# Patient Record
Sex: MALE | Race: WHITE | NOT HISPANIC OR LATINO | Employment: PART TIME | URBAN - NONMETROPOLITAN AREA
[De-identification: names, ages, dates, MRNs, and addresses within clinical notes are randomized per-mention and may not be internally consistent; named-entity substitution may affect disease eponyms.]

---

## 2023-12-17 ENCOUNTER — HOSPITAL ENCOUNTER (EMERGENCY)
Facility: HOSPITAL | Age: 38
Discharge: HOME | End: 2023-12-17
Attending: FAMILY MEDICINE
Payer: MEDICAID

## 2023-12-17 VITALS
TEMPERATURE: 98 F | WEIGHT: 236 LBS | HEART RATE: 64 BPM | SYSTOLIC BLOOD PRESSURE: 118 MMHG | RESPIRATION RATE: 18 BRPM | OXYGEN SATURATION: 99 % | BODY MASS INDEX: 31.97 KG/M2 | DIASTOLIC BLOOD PRESSURE: 78 MMHG | HEIGHT: 72 IN

## 2023-12-17 DIAGNOSIS — N20.0 KIDNEY STONE: ICD-10-CM

## 2023-12-17 DIAGNOSIS — N13.30 HYDRONEPHROSIS, UNSPECIFIED HYDRONEPHROSIS TYPE: Primary | ICD-10-CM

## 2023-12-17 LAB
APPEARANCE UR: CLEAR
BILIRUB UR QL STRIP.AUTO: NEGATIVE
COLOR UR AUTO: YELLOW
GLUCOSE UR QL STRIP.AUTO: NEGATIVE
KETONES UR QL STRIP.AUTO: NEGATIVE
LEUKOCYTE ESTERASE UR QL STRIP.AUTO: NEGATIVE
NITRITE UR QL STRIP.AUTO: NEGATIVE
PH UR STRIP.AUTO: 6.5 [PH]
PROT UR QL STRIP.AUTO: NEGATIVE
RBC UR QL AUTO: NEGATIVE
SP GR UR STRIP.AUTO: 1.02 (ref 1–1.03)
UROBILINOGEN UR STRIP-ACNC: 0.2

## 2023-12-17 PROCEDURE — 63600175 PHARM REV CODE 636 W HCPCS: Performed by: FAMILY MEDICINE

## 2023-12-17 PROCEDURE — 96372 THER/PROPH/DIAG INJ SC/IM: CPT | Performed by: FAMILY MEDICINE

## 2023-12-17 PROCEDURE — 99285 EMERGENCY DEPT VISIT HI MDM: CPT | Mod: 25

## 2023-12-17 PROCEDURE — 81003 URINALYSIS AUTO W/O SCOPE: CPT | Performed by: FAMILY MEDICINE

## 2023-12-17 PROCEDURE — 25000003 PHARM REV CODE 250: Performed by: FAMILY MEDICINE

## 2023-12-17 RX ORDER — KETOROLAC TROMETHAMINE 30 MG/ML
60 INJECTION, SOLUTION INTRAMUSCULAR; INTRAVENOUS
Status: COMPLETED | OUTPATIENT
Start: 2023-12-17 | End: 2023-12-17

## 2023-12-17 RX ORDER — CIPROFLOXACIN 500 MG/1
500 TABLET ORAL 2 TIMES DAILY
Qty: 20 TABLET | Refills: 0 | Status: SHIPPED | OUTPATIENT
Start: 2023-12-17 | End: 2023-12-27

## 2023-12-17 RX ORDER — OXYCODONE AND ACETAMINOPHEN 5; 325 MG/1; MG/1
1 TABLET ORAL EVERY 4 HOURS PRN
Qty: 10 TABLET | Refills: 0 | Status: SHIPPED | OUTPATIENT
Start: 2023-12-17 | End: 2024-01-08 | Stop reason: SDUPTHER

## 2023-12-17 RX ORDER — ACETAMINOPHEN 500 MG
1000 TABLET ORAL
Status: COMPLETED | OUTPATIENT
Start: 2023-12-17 | End: 2023-12-17

## 2023-12-17 RX ADMIN — ACETAMINOPHEN 1000 MG: 500 TABLET, FILM COATED ORAL at 10:12

## 2023-12-17 RX ADMIN — KETOROLAC TROMETHAMINE 60 MG: 30 INJECTION, SOLUTION INTRAMUSCULAR; INTRAVENOUS at 10:12

## 2023-12-17 NOTE — ED PROVIDER NOTES
Encounter Date: 12/17/2023       History     Chief Complaint   Patient presents with    Flank Pain     Pt c/o left sided flank pain onset 9 months increasing in intensity this morning.       Patient presents for LEFT Flank pain. Pain is sharp. Constant for the past two days. Patient denies having any nausea and vomiting. Patient denies having any other associated symptoms at present.      The history is provided by the patient.     Review of patient's allergies indicates:  No Known Allergies  History reviewed. No pertinent past medical history.  History reviewed. No pertinent surgical history.  History reviewed. No pertinent family history.     Review of Systems   Constitutional: Negative.    HENT: Negative.     Eyes: Negative.    Respiratory: Negative.     Cardiovascular: Negative.    Gastrointestinal:  Positive for abdominal pain.   Endocrine: Negative.    Genitourinary:  Positive for flank pain.   Skin: Negative.    Allergic/Immunologic: Negative.    Neurological: Negative.    Hematological: Negative.    Psychiatric/Behavioral: Negative.         Physical Exam     Initial Vitals [12/17/23 0914]   BP Pulse Resp Temp SpO2   134/84 65 15 97.8 °F (36.6 °C) 100 %      MAP       --         Physical Exam    Vitals reviewed.  Constitutional: He appears well-developed.   HENT:   Head: Normocephalic and atraumatic.   Eyes: EOM are normal. Pupils are equal, round, and reactive to light.   Neck: Neck supple.   Normal range of motion.  Cardiovascular:  Normal rate, regular rhythm and normal heart sounds.           Pulmonary/Chest: Breath sounds normal.   Abdominal: Abdomen is soft. Bowel sounds are normal.   Musculoskeletal:         General: Normal range of motion.      Cervical back: Normal range of motion and neck supple.     Neurological: He is alert and oriented to person, place, and time. He has normal reflexes. GCS score is 15. GCS eye subscore is 4. GCS verbal subscore is 5. GCS motor subscore is 6.   Skin: Skin is  warm.   Psychiatric: He has a normal mood and affect.         ED Course   Procedures  Labs Reviewed   URINALYSIS - Normal    Narrative:      URINE STABILITY IS 2 HOURS AT ROOM TEMP OR    SIX HOURS REFRIGERATED. PERFORMING TESTING ON    SPECIMENS GREATER THAN THIS AGE MAY AFFECT THE    FOLLOWING TESTS:    PH          SPECIFIC GRAVITY           BLOOD    CLARITY     BILIRUBIN               UROBILINOGEN          Imaging Results              CT Abdomen Pelvis  Without Contrast (Final result)  Result time 12/17/23 10:12:20      Final result by Yovanny Francis MD (12/17/23 10:12:20)                   Impression:      Mild left hydronephrosis secondary to a stone in the left renal pelvis measuring 2.1 cm.      Electronically signed by: Yovanny Francis  Date:    12/17/2023  Time:    10:12               Narrative:    EXAMINATION:  CT ABDOMEN PELVIS WITHOUT CONTRAST    CLINICAL HISTORY:  Abdominal pain, acute, nonlocalized;    TECHNIQUE:  Low dose axial images, sagittal and coronal reformations were obtained from the lung bases to the pubic symphysis without contrast.    COMPARISON:  None    FINDINGS:  Heart: Normal in size. No pericardial effusion.    Lung Bases: Well aerated, without consolidation or pleural fluid.    Liver: Normal in size and attenuation, with no focal hepatic lesions.    Gallbladder: No calcified gallstones.    Bile Ducts: No evidence of dilated ducts.    Pancreas: No ductal dilatation or peripancreatic fat stranding.    Spleen: Unremarkable.    Adrenals: Unremarkable.    Kidneys/ Ureters: Mild left hydronephrosis secondary to a stone in the left renal pelvis measuring 2.1 cm.  No right-sided renal/ureteral stones or hydronephrosis.    Bladder: Decompressed, limiting evaluation.    Reproductive organs: Unremarkable.    GI Tract/Mesentery: No evidence of bowel obstruction or inflammation.  Normal appendix.    Peritoneal Space: No ascites. No free air.    Retroperitoneum: No significant  adenopathy.    Abdominal wall: Unremarkable.    Vasculature: No significant atherosclerosis or aneurysm.    Bones: No acute fracture.  Mild degenerative changes of the spine.                                       Medications   ketorolac injection 60 mg (60 mg Intramuscular Given 12/17/23 1012)   acetaminophen tablet 1,000 mg (1,000 mg Oral Given 12/17/23 1012)     Medical Decision Making  Amount and/or Complexity of Data Reviewed  Labs: ordered.  Radiology: ordered.    Risk  OTC drugs.  Prescription drug management.                                      Clinical Impression:  Final diagnoses:  [N13.30] Hydronephrosis, unspecified hydronephrosis type (Primary)  [N20.0] Kidney stone          ED Disposition Condition    Discharge Stable          ED Prescriptions       Medication Sig Dispense Start Date End Date Auth. Provider    oxyCODONE-acetaminophen (PERCOCET) 5-325 mg per tablet Take 1 tablet by mouth every 4 (four) hours as needed for Pain. 10 tablet 12/17/2023 -- Yovanny Orellana MD    ciprofloxacin HCl (CIPRO) 500 MG tablet Take 1 tablet (500 mg total) by mouth 2 (two) times daily. for 10 days 20 tablet 12/17/2023 12/27/2023 Yovanny Orellana MD          Follow-up Information    None          Yovanny Orellana MD  12/17/23 2352

## 2023-12-19 ENCOUNTER — LAB VISIT (OUTPATIENT)
Dept: LAB | Facility: HOSPITAL | Age: 38
End: 2023-12-19
Attending: NURSE PRACTITIONER
Payer: MEDICAID

## 2023-12-19 ENCOUNTER — TELEPHONE (OUTPATIENT)
Dept: UROLOGY | Facility: CLINIC | Age: 38
End: 2023-12-19
Payer: MEDICAID

## 2023-12-19 DIAGNOSIS — N13.30 HYDRONEPHROSIS: Primary | ICD-10-CM

## 2023-12-19 DIAGNOSIS — N13.2 HYDRONEPHROSIS WITH RENAL AND URETERAL CALCULOUS OBSTRUCTION: Primary | ICD-10-CM

## 2023-12-19 LAB
ALBUMIN SERPL-MCNC: 4.5 G/DL (ref 3.4–5)
ALBUMIN/GLOB SERPL: 1.6 RATIO
ALP SERPL-CCNC: 78 UNIT/L (ref 50–144)
ALT SERPL-CCNC: 42 UNIT/L (ref 1–45)
ANION GAP SERPL CALC-SCNC: 7 MEQ/L (ref 2–13)
AST SERPL-CCNC: 41 UNIT/L (ref 17–59)
BASOPHILS # BLD AUTO: 0.07 X10(3)/MCL (ref 0.01–0.08)
BASOPHILS NFR BLD AUTO: 1.1 % (ref 0.1–1.2)
BILIRUB SERPL-MCNC: 0.5 MG/DL (ref 0–1)
BUN SERPL-MCNC: 13 MG/DL (ref 7–20)
CALCIUM SERPL-MCNC: 9.5 MG/DL (ref 8.4–10.2)
CHLORIDE SERPL-SCNC: 105 MMOL/L (ref 98–110)
CO2 SERPL-SCNC: 28 MMOL/L (ref 21–32)
CREAT SERPL-MCNC: 0.79 MG/DL (ref 0.66–1.25)
CREAT/UREA NIT SERPL: 16 (ref 12–20)
EOSINOPHIL # BLD AUTO: 0.35 X10(3)/MCL (ref 0.04–0.54)
EOSINOPHIL NFR BLD AUTO: 5.3 % (ref 0.7–7)
ERYTHROCYTE [DISTWIDTH] IN BLOOD BY AUTOMATED COUNT: 12.3 %
GFR SERPLBLD CREATININE-BSD FMLA CKD-EPI: >90 MLS/MIN/1.73/M2
GLOBULIN SER-MCNC: 2.9 GM/DL (ref 2–3.9)
GLUCOSE SERPL-MCNC: 84 MG/DL (ref 70–115)
HCT VFR BLD AUTO: 48.2 % (ref 36–52)
HGB BLD-MCNC: 16.3 G/DL (ref 13–18)
IMM GRANULOCYTES # BLD AUTO: 0.03 X10(3)/MCL (ref 0–0.03)
IMM GRANULOCYTES NFR BLD AUTO: 0.5 % (ref 0–0.5)
LYMPHOCYTES # BLD AUTO: 1.4 X10(3)/MCL (ref 1.32–3.57)
LYMPHOCYTES NFR BLD AUTO: 21.2 % (ref 20–55)
MCH RBC QN AUTO: 31.9 PG (ref 27–34)
MCHC RBC AUTO-ENTMCNC: 33.8 G/DL (ref 31–37)
MCV RBC AUTO: 94.3 FL (ref 79–99)
MONOCYTES # BLD AUTO: 0.61 X10(3)/MCL (ref 0.3–0.82)
MONOCYTES NFR BLD AUTO: 9.2 % (ref 4.7–12.5)
NEUTROPHILS # BLD AUTO: 4.15 X10(3)/MCL (ref 1.78–5.38)
NEUTROPHILS NFR BLD AUTO: 62.7 % (ref 37–73)
NRBC BLD AUTO-RTO: 0 %
PLATELET # BLD AUTO: 284 X10(3)/MCL (ref 140–371)
PMV BLD AUTO: 9.8 FL (ref 9.4–12.4)
POTASSIUM SERPL-SCNC: 4.7 MMOL/L (ref 3.5–5.1)
PROT SERPL-MCNC: 7.4 GM/DL (ref 6.3–8.2)
RBC # BLD AUTO: 5.11 X10(6)/MCL (ref 4–6)
SODIUM SERPL-SCNC: 140 MMOL/L (ref 135–145)
WBC # SPEC AUTO: 6.61 X10(3)/MCL (ref 4–11.5)

## 2023-12-19 PROCEDURE — 85025 COMPLETE CBC W/AUTO DIFF WBC: CPT

## 2023-12-19 PROCEDURE — 36415 COLL VENOUS BLD VENIPUNCTURE: CPT

## 2023-12-19 PROCEDURE — 80053 COMPREHEN METABOLIC PANEL: CPT

## 2023-12-19 NOTE — TELEPHONE ENCOUNTER
Called and let a message for pt to call back      ----- Message from Alyx Marie sent at 12/19/2023  3:40 PM CST -----  Contact: Beltran  .Type:  Patient Returning Call    Who Called:Beltran   Who Left Message for Patient:nurse   Does the patient know what this is regarding?:appt   Would the patient rather a call back or a response via [facility]? Call   Best Call Back Number:.[phone]   Additional Information: Pt requesting a call back

## 2023-12-20 ENCOUNTER — OFFICE VISIT (OUTPATIENT)
Dept: UROLOGY | Facility: CLINIC | Age: 38
End: 2023-12-20
Payer: MEDICAID

## 2023-12-20 ENCOUNTER — HOSPITAL ENCOUNTER (OUTPATIENT)
Dept: RADIOLOGY | Facility: HOSPITAL | Age: 38
Discharge: HOME | End: 2023-12-20
Attending: NURSE PRACTITIONER
Payer: MEDICAID

## 2023-12-20 VITALS — BODY MASS INDEX: 30.66 KG/M2 | WEIGHT: 226.38 LBS | HEIGHT: 72 IN

## 2023-12-20 DIAGNOSIS — N13.30 HYDRONEPHROSIS: Primary | ICD-10-CM

## 2023-12-20 DIAGNOSIS — R82.71 BACTERIA IN URINE: ICD-10-CM

## 2023-12-20 LAB
BILIRUBIN, UA POC OHS: ABNORMAL
BLOOD, UA POC OHS: ABNORMAL
CLARITY, UA POC OHS: CLEAR
COLOR, UA POC OHS: YELLOW
GLUCOSE, UA POC OHS: NEGATIVE
KETONES, UA POC OHS: ABNORMAL
LEUKOCYTES, UA POC OHS: ABNORMAL
NITRITE, UA POC OHS: NEGATIVE
PH, UA POC OHS: 5.5
PROTEIN, UA POC OHS: 30
SPECIFIC GRAVITY, UA POC OHS: >=1.03
UROBILINOGEN, UA POC OHS: 0.2

## 2023-12-20 PROCEDURE — 1159F PR MEDICATION LIST DOCUMENTED IN MEDICAL RECORD: ICD-10-PCS | Mod: CPTII,S$GLB,, | Performed by: NURSE PRACTITIONER

## 2023-12-20 PROCEDURE — 3008F BODY MASS INDEX DOCD: CPT | Mod: CPTII,S$GLB,, | Performed by: NURSE PRACTITIONER

## 2023-12-20 PROCEDURE — 1159F MED LIST DOCD IN RCRD: CPT | Mod: CPTII,S$GLB,, | Performed by: NURSE PRACTITIONER

## 2023-12-20 PROCEDURE — 3008F PR BODY MASS INDEX (BMI) DOCUMENTED: ICD-10-PCS | Mod: CPTII,S$GLB,, | Performed by: NURSE PRACTITIONER

## 2023-12-20 PROCEDURE — 81003 URINALYSIS AUTO W/O SCOPE: CPT | Mod: QW,S$GLB,, | Performed by: NURSE PRACTITIONER

## 2023-12-20 PROCEDURE — 74018 RADEX ABDOMEN 1 VIEW: CPT | Mod: TC

## 2023-12-20 PROCEDURE — 81003 POCT URINALYSIS(INSTRUMENT): ICD-10-PCS | Mod: QW,S$GLB,, | Performed by: NURSE PRACTITIONER

## 2023-12-20 PROCEDURE — 99204 PR OFFICE/OUTPT VISIT, NEW, LEVL IV, 45-59 MIN: ICD-10-PCS | Mod: S$GLB,,, | Performed by: NURSE PRACTITIONER

## 2023-12-20 PROCEDURE — 99204 OFFICE O/P NEW MOD 45 MIN: CPT | Mod: S$GLB,,, | Performed by: NURSE PRACTITIONER

## 2023-12-20 NOTE — PROGRESS NOTES
"Subjective:       Patient ID: Beltran Car is a 38 y.o. male.    Chief Complaint: No chief complaint on file.      HPI: 38-year-old male new to the service presents today with family member complaints of left flank pain.  Patient states it has been ongoing for months.  He states that while in "alf" an x-ray was performed identifying a stone.  More recently December 17th this year he presented to the emergency room with severe left flank pain.  CT scan identified at 2.1 cm left UVJ stone.  Patient was given Toradol IM and Tylenol in the hospital discharged on Percocet and an oral antibiotic of which he does not remember the name.  He has been afebrile.  His pain waxes and wanes.  Currently he is comfortable         Past Medical History:   Past Medical History:   Diagnosis Date    Kidney stone        Past Surgical Historical: History reviewed. No pertinent surgical history.     Medications:   Medication List with Changes/Refills   Current Medications    CIPROFLOXACIN HCL (CIPRO) 500 MG TABLET    Take 1 tablet (500 mg total) by mouth 2 (two) times daily. for 10 days    OXYCODONE-ACETAMINOPHEN (PERCOCET) 5-325 MG PER TABLET    Take 1 tablet by mouth every 4 (four) hours as needed for Pain.        Past Social History:   Social History     Socioeconomic History    Marital status: Single   Tobacco Use    Smoking status: Every Day     Types: Cigarettes    Smokeless tobacco: Never       Allergies: Review of patient's allergies indicates:  No Known Allergies     Family History: History reviewed. No pertinent family history.     Review of Systems:  Review of Systems   Constitutional:  Negative for fever and unexpected weight change.   HENT:  Negative for facial swelling and trouble swallowing.    Eyes:  Negative for pain and visual disturbance.   Respiratory:  Negative for chest tightness, shortness of breath and wheezing.    Cardiovascular:  Negative for leg swelling.   Gastrointestinal:  Negative for abdominal distention, " abdominal pain, anal bleeding, blood in stool and rectal pain.   Genitourinary:  Positive for flank pain. Negative for decreased urine volume, difficulty urinating, dysuria, enuresis, frequency, hematuria and urgency.   Musculoskeletal:  Negative for back pain.   Skin:  Negative for color change.   Neurological:  Negative for dizziness, seizures, syncope and weakness.   Psychiatric/Behavioral:  Negative for suicidal ideas.        Physical Exam:  Physical Exam  Constitutional:       Appearance: He is well-developed.   HENT:      Head: Normocephalic.   Eyes:      General: No scleral icterus.  Pulmonary:      Effort: Pulmonary effort is normal.      Breath sounds: Normal breath sounds.   Abdominal:      General: There is no distension.      Palpations: Abdomen is soft.      Tenderness: There is no abdominal tenderness.      Hernia: No hernia is present. There is no hernia in the right inguinal area or left inguinal area.   Genitourinary:     Penis: Normal.       Testes: Normal. Cremasteric reflex is present.   Musculoskeletal:      Cervical back: Normal range of motion.   Skin:     General: Skin is warm and dry.   Neurological:      Mental Status: He is alert and oriented to person, place, and time.         Assessment/Plan:   Left renal pelvis versus UVJ stone--symptomatic.  We are going to repeat a KUB at this time so I can further evaluate whether patient will be candidate for ESWL versus laser stone extraction.  Urinalysis today shows 10-20 white cells, 1+ bacteria nitrite negative.  Will culture urine.  He is asymptomatic I asked him to continue his current antibiotic as provided by the emergency room till we get culture results.  Problem List Items Addressed This Visit    None  Visit Diagnoses       Bacteria in urine    -  Primary    Relevant Orders    Urine culture    Hydronephrosis        Relevant Orders    X-Ray KUB    POCT Urinalysis(Instrument)

## 2023-12-22 ENCOUNTER — TELEPHONE (OUTPATIENT)
Dept: UROLOGY | Facility: CLINIC | Age: 38
End: 2023-12-22
Payer: MEDICAID

## 2023-12-22 NOTE — TELEPHONE ENCOUNTER
----- Message from Mirtha Hsu sent at 12/21/2023 10:55 AM CST -----  Regarding: office notes  Zoe caal/[facility] calling for office notes for pt visit 12/20/2023 and they can be reached at [phone] and fax number [phone].    Thanks,

## 2023-12-22 NOTE — TELEPHONE ENCOUNTER
----- Message from Scot Maddox sent at 12/22/2023  9:24 AM CST -----  Contact: Beltran Saul is calling to get a refill on oxyCODONE-acetaminophen (PERCOCET) 5-325 mg per tablet. Please call back at [phone]             [facility] [place] - [address]  [address] [place] [address]  Phone: [phone] Fax: [phone]  Hours: Not open 24 hours                Thanks  SW

## 2023-12-22 NOTE — TELEPHONE ENCOUNTER
Contacted pt, pt is requesting refill. Provider RC/NP has not prescribe pt meds. Suggested to rotate Tylenol/Ibuprofen. If needing refill he needs to contact provider that prescribed rx. Pt verbalized understanding. BJP    ----- Message from Mirtha Hsu sent at 12/22/2023 12:06 PM CST -----  Contact: pt  Pt returning a missed call and can be reached at [phone]    Thanks,

## 2023-12-23 LAB — URINE CULTURE, ROUTINE: NORMAL

## 2023-12-26 ENCOUNTER — TELEPHONE (OUTPATIENT)
Dept: UROLOGY | Facility: CLINIC | Age: 38
End: 2023-12-26
Payer: MEDICAID

## 2023-12-26 NOTE — TELEPHONE ENCOUNTER
----- Message from Aylin Hernandez MA sent at 12/26/2023  1:49 PM CST -----  Contact: Krystina (Mother    ----- Message -----  From: Ivone Tamayo  Sent: 12/26/2023  12:39 PM CST  To: Edmundo Stock Staff    Type:  Patient Returning Call    Who Called: Krystina (Mother)  Who Left Message for Patient: Aylin  Does the patient know what this is regarding?: Yes  Would the patient rather a call back or a response via [facility]? Call   Best Call Back Number: [phone]  Additional Information: n/a          
----- Message from Julisa Lane sent at 12/26/2023  9:53 AM CST -----  Contact: Patient  Courtney with [facility] is calling regarding an order for the patient. He is at their office and seems to be confused about an appt he has but I am not showing any appts. Please call Courtney at [phone]. The patient is having to use his Mom phone and that number is [phone].       
----- Message from Linda Cevallos sent at 12/26/2023  8:09 AM CST -----  Contact: Anastasiya/leti  Type:  Same Day Appointment Request    Caller is requesting a same day appointment.  Caller declined first available appointment listed below.    Name of Caller:Sanaz  When is the first available appointment?02/2024  Symptoms:kidney stones  Best Call Back Number:[phone]  Additional Information:       
LVMTRC  
LVMTRC and discuss and also left message to present to ER for evaluation of severe pain, N/V, or fever, etc.   
Spoke with Courtney who stated pt showed up to their office with questions on next steps after x-ray. I informed her that we have tried calling patient at which she stated he has a prepaid phone and sometimes can be hard to get in touch with. She wanted to have us send information once set up with patient. She asked that we try to reach patient and I informed her after so many times of calling, a letter will be sent out if we can't reach him.     Also called patient again on today with no answer. [facility]  
1. The severity of signs/symptoms.(See ED/admit documents)

## 2023-12-27 ENCOUNTER — TELEPHONE (OUTPATIENT)
Dept: UROLOGY | Facility: CLINIC | Age: 38
End: 2023-12-27
Payer: MEDICAID

## 2023-12-27 NOTE — TELEPHONE ENCOUNTER
[facility], being that I've called pt at both numbers on file several times for multiple days now I will be sending out a letter going forward.  Informed via Vm.

## 2023-12-27 NOTE — TELEPHONE ENCOUNTER
Attempted to return call, left detailed message regarding proceeding to er for severe pain due to kidney stones.     ----- Message from Clarice Araya sent at 12/27/2023  3:42 PM CST -----  Regarding: Appointment  Contact: Patient  Per phone call with patient, he stated that Montrell Wyatt had retired and another physician is supposed to call and schedule an appointment to see the physician for stones and its been hurting a lot.  Please return call at [phone] (home), if no answer please leave a detail message.  The caller would like to know if medication can be called out until he see the physician.    [facility] [place] - [address]  [address] [address]  Phone: [phone] Fax: [phone]        LUCIANO Peralta

## 2023-12-27 NOTE — TELEPHONE ENCOUNTER
----- Message from Aylin Hernandez MA sent at 12/26/2023  2:08 PM CST -----  Contact: pt mother    ----- Message -----  From: Mirtha Hsu  Sent: 12/26/2023   2:01 PM CST  To: Mode Maldonado Staff    Pt mother Anastasiya returning a missed call and can be reached at [phone]    Thanks,

## 2023-12-29 ENCOUNTER — TELEPHONE (OUTPATIENT)
Dept: UROLOGY | Facility: CLINIC | Age: 38
End: 2023-12-29
Payer: MEDICAID

## 2023-12-29 NOTE — TELEPHONE ENCOUNTER
----- Message from Aylin Hernandez MA sent at 12/28/2023  4:23 PM CST -----  Contact: Anastasiya(mother)  Patient's mother is calling stating no one from the office has contacted them, but I have in fact called each # given multiple times each day with no answer. I have even talked to his PCP staff with an update. I saw that you left a message as well, so a letter was mailed yesterday. Can you please help me with is?  ----- Message -----  From: Kitty Pablo  Sent: 12/28/2023   4:14 PM CST  To: Edmundo Stock Staff    Anastasiya called to consult with nurse or staff regarding the patient. She states the clinic was to contact the office to schedule the patient for a follow up but states no one has reached out. She would like a call back and can be reached at [phone]. Thanks/MR

## 2023-12-29 NOTE — TELEPHONE ENCOUNTER
----- Message from Mckenna Hernandez sent at 12/29/2023  3:15 PM CST -----  Patient is returning call for schedule appointment please call her back at  [phone].              Thanks  farzana

## 2023-12-29 NOTE — TELEPHONE ENCOUNTER
I called pt to inform him of Urine cx results confirming no growth. Pt did not answer either phone number listed in contacts. Jennifer Landa     ----- Message from Lexis Cuellar NP sent at 12/28/2023  5:09 PM CST -----  Please notify patient of no growth found on urine culture.

## 2023-12-29 NOTE — TELEPHONE ENCOUNTER
LVM: numerous unsuccessful attempts made to contact patient since, letter mailed. Please have patient or someone on patients behalf contact office and speak directly weith staff if available and/or update contact information with call center.

## 2024-01-03 ENCOUNTER — TELEPHONE (OUTPATIENT)
Dept: UROLOGY | Facility: CLINIC | Age: 39
End: 2024-01-03
Payer: MEDICAID

## 2024-01-03 DIAGNOSIS — N20.0 KIDNEY STONE: Primary | ICD-10-CM

## 2024-01-03 NOTE — TELEPHONE ENCOUNTER
----- Message from Shahana Neil MA sent at 1/3/2024 12:26 PM CST -----  Contact: self    ----- Message -----  From: Landy Carreon  Sent: 1/3/2024  10:21 AM CST  To: Brionna Singh Staff    Type: Staff Message  Caller: Beltran Cra  Call Back Number: [phone]  Nature of the Call: pt received a letter to schedule an apt to remove large kidney stone for shock therapy( pt of Montrell Wyatt) please advise   Additional Information: na

## 2024-01-03 NOTE — TELEPHONE ENCOUNTER
Spoke with pts partner who has scheduled pt for his ESWL procedure on 01/12/24 and his consents are scheduled for 01/08/2024. Surgery order brought to scheduling.

## 2024-01-03 NOTE — TELEPHONE ENCOUNTER
----- Message from Lexis Cuellar NP sent at 1/3/2024  4:15 PM CST -----    ----- Message -----  From: Mckenna Hernandez  Sent: 1/3/2024   3:26 PM CST  To: Brionna Singh Staff    Patient is calling in regards to letter receive to scheduled an appointment please call him back at [phone]. Patient is also experiencing throwing up and pain.            Thanks  [facility]

## 2024-01-05 ENCOUNTER — HOSPITAL ENCOUNTER (EMERGENCY)
Facility: HOSPITAL | Age: 39
Discharge: HOME | End: 2024-01-05
Attending: FAMILY MEDICINE
Payer: MEDICAID

## 2024-01-05 VITALS
HEIGHT: 72 IN | RESPIRATION RATE: 20 BRPM | WEIGHT: 236 LBS | SYSTOLIC BLOOD PRESSURE: 147 MMHG | DIASTOLIC BLOOD PRESSURE: 98 MMHG | BODY MASS INDEX: 31.97 KG/M2 | OXYGEN SATURATION: 99 % | HEART RATE: 79 BPM | TEMPERATURE: 98 F

## 2024-01-05 DIAGNOSIS — N20.0 KIDNEY STONE: Primary | ICD-10-CM

## 2024-01-05 PROCEDURE — 99283 EMERGENCY DEPT VISIT LOW MDM: CPT

## 2024-01-05 RX ORDER — KETOROLAC TROMETHAMINE 30 MG/ML
60 INJECTION, SOLUTION INTRAMUSCULAR; INTRAVENOUS
Status: DISCONTINUED | OUTPATIENT
Start: 2024-01-05 | End: 2024-01-05 | Stop reason: HOSPADM

## 2024-01-05 RX ORDER — OXYCODONE AND ACETAMINOPHEN 5; 325 MG/1; MG/1
1 TABLET ORAL EVERY 6 HOURS PRN
Qty: 10 TABLET | Refills: 0 | Status: SHIPPED | OUTPATIENT
Start: 2024-01-05

## 2024-01-05 NOTE — ED PROVIDER NOTES
Encounter Date: 1/5/2024       History     Chief Complaint   Patient presents with    Flank Pain     C/o left flank pain onset x 4 days, pt was dx with 2.1 cm on 12/17, given percocet on that visit and given pain then went to pmd on 12/20 and had kub and pt reports given med then but out, sx schedules in [place] on 1/12, pt last took ibuprofen 0700      Patient presented to the emergency room with a left-sided kidney stone has been there for about 2 weeks.  Has an appointment with his urologist in a week.  Out of pain pills.    The history is provided by the patient.     Review of patient's allergies indicates:  No Known Allergies  Past Medical History:   Diagnosis Date    Kidney stone      History reviewed. No pertinent surgical history.  History reviewed. No pertinent family history.  Social History     Tobacco Use    Smoking status: Every Day     Types: Cigarettes    Smokeless tobacco: Never   Substance Use Topics    Alcohol use: Not Currently    Drug use: Yes     Types: Marijuana     Review of Systems   Constitutional:  Negative for fever.   HENT:  Negative for sore throat.    Respiratory:  Negative for shortness of breath.    Cardiovascular:  Negative for chest pain.   Gastrointestinal:  Negative for nausea.   Genitourinary:  Positive for flank pain. Negative for dysuria.   Musculoskeletal:  Negative for back pain.   Skin:  Negative for rash.   Neurological:  Negative for weakness.   Hematological:  Does not bruise/bleed easily.   All other systems reviewed and are negative.      Physical Exam     Initial Vitals [01/05/24 1037]   BP Pulse Resp Temp SpO2   (!) 147/98 79 20 98.2 °F (36.8 °C) 99 %      MAP       --         Physical Exam    Nursing note and vitals reviewed.  Constitutional: Vital signs are normal. He appears well-developed and well-nourished. He is cooperative.  Non-toxic appearance. He does not appear ill. No distress.   HENT:   Head: Normocephalic and atraumatic.   Eyes: Conjunctivae and  lids are normal.   Neck: Trachea normal. Neck supple.   Cardiovascular:  Normal rate and regular rhythm.  No extrasystoles are present.          Pulmonary/Chest: Breath sounds normal.   Abdominal: Abdomen is soft. There is no abdominal tenderness.   Musculoskeletal:         General: Normal range of motion.      Cervical back: Neck supple.     Neurological: He is alert and oriented to person, place, and time. He has normal strength. No cranial nerve deficit or sensory deficit. He displays a negative Romberg sign.   Skin: Skin is warm, dry and intact. Capillary refill takes less than 2 seconds.   Psychiatric: He has a normal mood and affect. His speech is normal and behavior is normal. He is not actively hallucinating. He is attentive.         ED Course   Procedures  Labs Reviewed - No data to display       Imaging Results    None          Medications   ketorolac injection 60 mg (has no administration in time range)     Medical Decision Making  Risk  Prescription drug management.                                      Clinical Impression:  Final diagnoses:  [N20.0] Kidney stone (Primary)          ED Disposition Condition    Discharge Stable          ED Prescriptions       Medication Sig Dispense Start Date End Date Auth. Provider    oxyCODONE-acetaminophen (PERCOCET) 5-325 mg per tablet Take 1 tablet by mouth every 6 (six) hours as needed for Pain. 10 tablet 1/5/2024 -- Montrell Woodward MD          Follow-up Information       Follow up With Specialties Details Why Contact Info    Marci Liriano FNP-C Family Medicine Call  As needed [address] [address]  [phone]               Montrell Woodward MD  01/05/24 4877

## 2024-01-08 ENCOUNTER — CLINICAL SUPPORT (OUTPATIENT)
Dept: UROLOGY | Facility: CLINIC | Age: 39
End: 2024-01-08
Payer: MEDICAID

## 2024-01-08 DIAGNOSIS — N20.0 KIDNEY STONE: Primary | ICD-10-CM

## 2024-01-08 NOTE — PATIENT INSTRUCTIONS
Patient Education       Kidney Stones Discharge Instructions   About this topic   Kidney stones are a build up of salts and minerals from your urine. Most of the time a kidney stone leaves your body when you urinate. Sometimes the kidney stone can get stuck on the way out and then you have pain in your lower back, side, or lower belly. You can also have blood in your urine and it may hurt when you urinate.  Kidney stones are hard and can get stuck in your urinary tract or block the flow of urine on the way out of the body. The urinary tract is made up of the kidney, ureters, bladder, and urethra. The kidneys make urine and it drains down into tubes called ureters. These ureters are connected to the bladder. The bladder then squeezes out the urine and it exits the body through the urethra.  Treatment depends on the type of stone, size of the stone, and where it is along your urinary tract. Your doctor may send the stone to a lab to learn more about it and how to best treat you.     What care is needed at home?   Ask your doctor what you need to do when you go home. Make sure you ask questions if you do not understand what the doctor says.  Drink lots of fluids to help pass your kidney stone.  You may be asked to use a filter to strain your urine. The filter catches the stones.  Your doctor may want to send the stones to a lab to test them.  You may need to take medicine to help with the pain as your kidney stone passes.  What follow-up care is needed?   Your doctor may ask you to make visits to the office to check on your progress. Be sure to keep these visits.  Your doctor will tell you if other tests are needed.  Your doctor may send you to a kidney specialist. This kind of doctor is called a urologist.  What drugs may be needed?   The doctor may order drugs to:  Help with pain  Prevent infection  Help flush out kidney stones  Prevent kidney stones  Will physical activity be limited?   You may have to limit your  activity. Talk to your doctor about the right amount of activity for you.  What changes to diet are needed?   Talk to your doctor or dietitian about your personal diet plan. Ask if there are foods you should eat more or less of, based on the kind of stone you had.  Avoid caffeinated drinks that can overwork your urinary tract.  What problems could happen?   Kidney infection  Kidney damage  Block in the urinary system  High blood pressure  What can be done to prevent this health problem?   Prevent or treat urinary tract infections.   Drink lots of water during the day. When you have less fluid in your body, urine becomes concentrated. This increases your chance of kidney stones.  Take drugs as ordered by your doctor.  Limit foods or drugs that may cause kidney stones.  When do I need to call the doctor?   You do not urinate for more than 8 hours.  You have a fever of 100.4°F (38°C) or higher or chills.  Your urine is cloudy, smells bad, or is more bloody.  The pain from your kidney stone gets very bad and is not helped by pain medicine.  You are throwing up and cant keep liquids down.  Your pain does not go away after 1 to 2 weeks.  Teach Back: Helping You Understand   The Teach Back Method helps you understand the information we are giving you. After you talk with the staff, tell them in your own words what you learned. This helps to make sure the staff has described each thing clearly. It also helps to explain things that may have been confusing. Before going home, make sure you can do these:  I can tell you about my condition.  I can tell you what changes I need to make with my diet or drugs.  I can tell you what I will do if I have very bad pain in my back or side.  Where can I learn more?   [employer]  https://familydoctor.org/condition/kidney-stones/   National Kidney and Urologic Diseases Information Clearinghouse  http://kidney.niddk.nih.gov/kudiseases/pubs/stones_ez/   [facility]  Choices  https://www.nhs.uk/conditions/kidney-stones/   Last Reviewed Date   2021-06-08  Consumer Information Use and Disclaimer   This information is not specific medical advice and does not replace information you receive from your health care provider. This is only a brief summary of general information. It does NOT include all information about conditions, illnesses, injuries, tests, procedures, treatments, therapies, discharge instructions or life-style choices that may apply to you. You must talk with your health care provider for complete information about your health and treatment options. This information should not be used to decide whether or not to accept your health care providers advice, instructions or recommendations. Only your health care provider has the knowledge and training to provide advice that is right for you.  Copyright   Copyright © 2021 [employer] and its affiliates and/or licensors. All rights reserved.

## 2024-01-12 ENCOUNTER — OUTSIDE PLACE OF SERVICE (OUTPATIENT)
Dept: UROLOGY | Facility: CLINIC | Age: 39
End: 2024-01-12

## 2024-01-12 DIAGNOSIS — N20.0 KIDNEY STONE: Primary | ICD-10-CM

## 2024-01-12 PROCEDURE — 50590 FRAGMENTING OF KIDNEY STONE: CPT | Mod: LT,,, | Performed by: UROLOGY

## 2024-01-12 RX ORDER — HYDROCODONE BITARTRATE AND ACETAMINOPHEN 7.5; 325 MG/1; MG/1
1 TABLET ORAL EVERY 6 HOURS PRN
Qty: 15 TABLET | Refills: 0 | Status: SHIPPED | OUTPATIENT
Start: 2024-01-12

## 2024-01-12 RX ORDER — CIPROFLOXACIN 500 MG/1
500 TABLET ORAL EVERY 12 HOURS
Qty: 6 TABLET | Refills: 0 | Status: SHIPPED | OUTPATIENT
Start: 2024-01-12 | End: 2024-01-15